# Patient Record
Sex: FEMALE | Race: WHITE | NOT HISPANIC OR LATINO | Employment: FULL TIME | URBAN - METROPOLITAN AREA
[De-identification: names, ages, dates, MRNs, and addresses within clinical notes are randomized per-mention and may not be internally consistent; named-entity substitution may affect disease eponyms.]

---

## 2020-03-13 ENCOUNTER — HOSPITAL ENCOUNTER (EMERGENCY)
Facility: HOSPITAL | Age: 30
Discharge: HOME | End: 2020-03-13
Attending: EMERGENCY MEDICINE
Payer: COMMERCIAL

## 2020-03-13 VITALS
RESPIRATION RATE: 18 BRPM | WEIGHT: 123.56 LBS | HEART RATE: 99 BPM | DIASTOLIC BLOOD PRESSURE: 74 MMHG | OXYGEN SATURATION: 100 % | HEIGHT: 62 IN | SYSTOLIC BLOOD PRESSURE: 131 MMHG | TEMPERATURE: 97 F | BODY MASS INDEX: 22.74 KG/M2

## 2020-03-13 DIAGNOSIS — R10.11 RIGHT UPPER QUADRANT ABDOMINAL PAIN: Primary | ICD-10-CM

## 2020-03-13 LAB
ALBUMIN SERPL BCP-MCNC: 3.9 G/DL (ref 3.5–5.2)
ALP SERPL-CCNC: 57 U/L (ref 55–135)
ALT SERPL W/O P-5'-P-CCNC: 11 U/L (ref 10–44)
ANION GAP SERPL CALC-SCNC: 11 MMOL/L (ref 8–16)
AST SERPL-CCNC: 16 U/L (ref 10–40)
B-HCG UR QL: NEGATIVE
BASOPHILS # BLD AUTO: 0.02 K/UL (ref 0–0.2)
BASOPHILS NFR BLD: 0.2 % (ref 0–1.9)
BILIRUB SERPL-MCNC: 0.5 MG/DL (ref 0.1–1)
BILIRUB UR QL STRIP: NEGATIVE
BUN SERPL-MCNC: 9 MG/DL (ref 6–20)
CALCIUM SERPL-MCNC: 9.4 MG/DL (ref 8.7–10.5)
CHLORIDE SERPL-SCNC: 106 MMOL/L (ref 95–110)
CLARITY UR: CLEAR
CO2 SERPL-SCNC: 23 MMOL/L (ref 23–29)
COLOR UR: YELLOW
CREAT SERPL-MCNC: 0.8 MG/DL (ref 0.5–1.4)
DIFFERENTIAL METHOD: ABNORMAL
EOSINOPHIL # BLD AUTO: 0.1 K/UL (ref 0–0.5)
EOSINOPHIL NFR BLD: 0.6 % (ref 0–8)
ERYTHROCYTE [DISTWIDTH] IN BLOOD BY AUTOMATED COUNT: 13.2 % (ref 11.5–14.5)
EST. GFR  (AFRICAN AMERICAN): >60 ML/MIN/1.73 M^2
EST. GFR  (NON AFRICAN AMERICAN): >60 ML/MIN/1.73 M^2
GLUCOSE SERPL-MCNC: 82 MG/DL (ref 70–110)
GLUCOSE UR QL STRIP: NEGATIVE
HCT VFR BLD AUTO: 37.1 % (ref 37–48.5)
HGB BLD-MCNC: 12.1 G/DL (ref 12–16)
HGB UR QL STRIP: NEGATIVE
HIV 1+2 AB+HIV1 P24 AG SERPL QL IA: NEGATIVE
IMM GRANULOCYTES # BLD AUTO: 0.03 K/UL (ref 0–0.04)
IMM GRANULOCYTES NFR BLD AUTO: 0.3 % (ref 0–0.5)
KETONES UR QL STRIP: ABNORMAL
LEUKOCYTE ESTERASE UR QL STRIP: NEGATIVE
LIPASE SERPL-CCNC: 12 U/L (ref 4–60)
LYMPHOCYTES # BLD AUTO: 2.4 K/UL (ref 1–4.8)
LYMPHOCYTES NFR BLD: 21.4 % (ref 18–48)
MCH RBC QN AUTO: 29.5 PG (ref 27–31)
MCHC RBC AUTO-ENTMCNC: 32.6 G/DL (ref 32–36)
MCV RBC AUTO: 91 FL (ref 82–98)
MONOCYTES # BLD AUTO: 0.5 K/UL (ref 0.3–1)
MONOCYTES NFR BLD: 4.7 % (ref 4–15)
NEUTROPHILS # BLD AUTO: 8.1 K/UL (ref 1.8–7.7)
NEUTROPHILS NFR BLD: 72.8 % (ref 38–73)
NITRITE UR QL STRIP: NEGATIVE
NRBC BLD-RTO: 0 /100 WBC
PH UR STRIP: 6 [PH] (ref 5–8)
PLATELET # BLD AUTO: 346 K/UL (ref 150–350)
PMV BLD AUTO: 10.3 FL (ref 9.2–12.9)
POTASSIUM SERPL-SCNC: 3.7 MMOL/L (ref 3.5–5.1)
PROT SERPL-MCNC: 7.7 G/DL (ref 6–8.4)
PROT UR QL STRIP: NEGATIVE
RBC # BLD AUTO: 4.1 M/UL (ref 4–5.4)
SODIUM SERPL-SCNC: 140 MMOL/L (ref 136–145)
SP GR UR STRIP: 1.02 (ref 1–1.03)
URN SPEC COLLECT METH UR: ABNORMAL
UROBILINOGEN UR STRIP-ACNC: NEGATIVE EU/DL
WBC # BLD AUTO: 11.1 K/UL (ref 3.9–12.7)

## 2020-03-13 PROCEDURE — 63600175 PHARM REV CODE 636 W HCPCS: Performed by: EMERGENCY MEDICINE

## 2020-03-13 PROCEDURE — 96375 TX/PRO/DX INJ NEW DRUG ADDON: CPT

## 2020-03-13 PROCEDURE — 36415 COLL VENOUS BLD VENIPUNCTURE: CPT

## 2020-03-13 PROCEDURE — 25000003 PHARM REV CODE 250: Performed by: EMERGENCY MEDICINE

## 2020-03-13 PROCEDURE — 86703 HIV-1/HIV-2 1 RESULT ANTBDY: CPT

## 2020-03-13 PROCEDURE — 99284 EMERGENCY DEPT VISIT MOD MDM: CPT | Mod: 25

## 2020-03-13 PROCEDURE — 80053 COMPREHEN METABOLIC PANEL: CPT

## 2020-03-13 PROCEDURE — 81025 URINE PREGNANCY TEST: CPT

## 2020-03-13 PROCEDURE — 85025 COMPLETE CBC W/AUTO DIFF WBC: CPT

## 2020-03-13 PROCEDURE — 83690 ASSAY OF LIPASE: CPT

## 2020-03-13 PROCEDURE — 81003 URINALYSIS AUTO W/O SCOPE: CPT

## 2020-03-13 PROCEDURE — 25500020 PHARM REV CODE 255: Performed by: FAMILY MEDICINE

## 2020-03-13 PROCEDURE — 96374 THER/PROPH/DIAG INJ IV PUSH: CPT | Mod: 59

## 2020-03-13 PROCEDURE — 25000003 PHARM REV CODE 250: Performed by: NURSE PRACTITIONER

## 2020-03-13 PROCEDURE — S0028 INJECTION, FAMOTIDINE, 20 MG: HCPCS | Performed by: EMERGENCY MEDICINE

## 2020-03-13 RX ORDER — FAMOTIDINE 10 MG/ML
20 INJECTION INTRAVENOUS
Status: COMPLETED | OUTPATIENT
Start: 2020-03-13 | End: 2020-03-13

## 2020-03-13 RX ORDER — ONDANSETRON 2 MG/ML
4 INJECTION INTRAMUSCULAR; INTRAVENOUS
Status: COMPLETED | OUTPATIENT
Start: 2020-03-13 | End: 2020-03-13

## 2020-03-13 RX ORDER — MORPHINE SULFATE 4 MG/ML
2 INJECTION, SOLUTION INTRAMUSCULAR; INTRAVENOUS
Status: COMPLETED | OUTPATIENT
Start: 2020-03-13 | End: 2020-03-13

## 2020-03-13 RX ADMIN — ONDANSETRON 4 MG: 2 INJECTION INTRAMUSCULAR; INTRAVENOUS at 05:03

## 2020-03-13 RX ADMIN — MORPHINE SULFATE 2 MG: 4 INJECTION, SOLUTION INTRAMUSCULAR; INTRAVENOUS at 05:03

## 2020-03-13 RX ADMIN — DICYCLOMINE HYDROCHLORIDE 50 ML: 10 SOLUTION ORAL at 03:03

## 2020-03-13 RX ADMIN — FAMOTIDINE 20 MG: 10 INJECTION INTRAVENOUS at 05:03

## 2020-03-13 RX ADMIN — IOHEXOL 75 ML: 350 INJECTION, SOLUTION INTRAVENOUS at 04:03

## 2020-03-13 NOTE — ED PROVIDER NOTES
29 year old female with complaint of right upper abdominal pain X 1-2 months with worsening X 1 week.  Pt had negative gallbladder u/s and HIDA scan.  No relief with Prilosec.        SCRIBE #1 NOTE: I, Conchita Kaiser, am scribing for, and in the presence of, Luisa Mauricio MD. I have scribed the HPI, ROS, and PEx.     SCRIBE #2 NOTE: I, Lanie Solorzano, am scribing for, and in the presence of,  Paula Rich MD. I have scribed the remaining portions of the note not scribed by Scribe #1.      History     Chief Complaint   Patient presents with    Abdominal Pain     pt reports RUQ pain started in Jan, currently seeing PCP for problem pain has worsen over the last week     Review of patient's allergies indicates:   Allergen Reactions    Bactrim [sulfamethoxazole-trimethoprim]     Cefzil [cefprozil]     Sulfa (sulfonamide antibiotics)          History of Present Illness     HPI    3/13/2020, 3:25 PM  History obtained from the patient      History of Present Illness: Nerissa Buckley is a 29 y.o. female patient who presents to the Emergency Department for evaluation of RUQ and epigastric abdominal pain which onset gradually 2 months ago. Symptoms are constant and moderate in severity. Pain is exacerbated after eating. No mitigating factors reported. Associated sxs include n/v. Patient denies any fever, chills, constipation, hematochezia, dysuria, hematuria, urinary frequency, diarrhea, and all other sxs at this time. Patient was evaluated by her PCP last week. US gallbladder and HIDA scan were both negative. States she pressed on patient's abdomen and pain has worsened since then. PCP advised patient to make and appointment with GI but patient did not because she wanted to come to ED instead. She started bland diet yesterday. Patient was given Prilosec and Bentyl but has not started medications. No further complaints or concerns at this time.       Arrival mode: Personal vehicle    PCP: Kelly Low MD         Past Medical History:  Past medical history reviewed not relevant      Past Surgical History:  Past surgical history reviewed not relevant      Family History:  Family history reviewed not relevant      Social History:  Social History    Social History Main Topics    Social History Main Topics    Smoking status: Unknown if ever smoked    Smokeless tobacco: Unknown if ever used    Alcohol Use: Unknown drinking history    Drug Use: Unknown if ever used    Sexual Activity: Unknown        Review of Systems     Review of Systems   Constitutional: Negative for activity change, appetite change, chills, diaphoresis, fatigue and fever.   HENT: Negative for congestion, ear pain, nosebleeds, rhinorrhea, sinus pain, sore throat and trouble swallowing.    Eyes: Negative for pain and discharge.   Respiratory: Negative for cough, chest tightness, shortness of breath, wheezing and stridor.    Cardiovascular: Negative for chest pain, palpitations and leg swelling.   Gastrointestinal: Positive for abdominal pain, nausea and vomiting. Negative for abdominal distention, blood in stool, constipation and diarrhea.   Genitourinary: Negative for difficulty urinating, dysuria, flank pain, frequency, hematuria and urgency.   Musculoskeletal: Negative for arthralgias, back pain, myalgias and neck pain.   Skin: Negative for pallor, rash and wound.   Neurological: Negative for dizziness, syncope, weakness, light-headedness, numbness and headaches.   Hematological: Does not bruise/bleed easily.   Psychiatric/Behavioral: Negative for confusion and self-injury.   All other systems reviewed and are negative.     Physical Exam     Initial Vitals [03/13/20 1352]   BP Pulse Resp Temp SpO2   (!) 143/91 91 18 99.1 °F (37.3 °C) 100 %      MAP       --          Physical Exam  Nursing Notes and Vital Signs Reviewed.  Constitutional: Patient is in no acute distress. Well-developed and well-nourished. Non toxic. Non ill-appearing.   Head: Atraumatic.  "Normocephalic.  Eyes: PERRL. EOM intact. Conjunctivae are not pale. No scleral icterus.  ENT: Mucous membranes are moist. Oropharynx is clear and symmetric.    Neck: Supple. Full ROM. No lymphadenopathy.  Cardiovascular: Regular rate. Regular rhythm. No murmurs, rubs, or gallops. Distal pulses are 2+ and symmetric.  Pulmonary/Chest: No respiratory distress. Clear to auscultation bilaterally. No wheezing or rales.  Abdominal: Soft and non-distended.  There is epigastric and RUQ tenderness.  No rebound, guarding, or rigidity. Good bowel sounds.  Genitourinary: No CVA tenderness  Musculoskeletal: Moves all extremities. No obvious deformities. No edema. No calf tenderness.  Skin: Warm and dry.  Neurological:  Alert, awake, and appropriate.  Normal speech.  No acute focal neurological deficits are appreciated.  Psychiatric: Normal affect. Good eye contact. Appropriate in content.     ED Course   Procedures  ED Vital Signs:  Vitals:    03/13/20 1352 03/13/20 1720 03/13/20 1900   BP: (!) 143/91 135/79 131/74   Pulse: 91  99   Resp: 18 20 18   Temp: 99.1 °F (37.3 °C) 98 °F (36.7 °C) 97.1 °F (36.2 °C)   TempSrc: Oral Oral Oral   SpO2: 100% 99% 100%   Weight: 56.1 kg (123 lb 9.1 oz)     Height: 5' 2" (1.575 m)         Abnormal Lab Results:  Labs Reviewed   CBC W/ AUTO DIFFERENTIAL - Abnormal; Notable for the following components:       Result Value    Gran # (ANC) 8.1 (*)     All other components within normal limits   URINALYSIS - Abnormal; Notable for the following components:    Ketones, UA 2+ (*)     All other components within normal limits   COMPREHENSIVE METABOLIC PANEL   LIPASE   PREGNANCY TEST, URINE RAPID   HIV 1 / 2 ANTIBODY        All Lab Results:  Results for orders placed or performed during the hospital encounter of 03/13/20   CBC auto differential   Result Value Ref Range    WBC 11.10 3.90 - 12.70 K/uL    RBC 4.10 4.00 - 5.40 M/uL    Hemoglobin 12.1 12.0 - 16.0 g/dL    Hematocrit 37.1 37.0 - 48.5 %    Mean " Corpuscular Volume 91 82 - 98 fL    Mean Corpuscular Hemoglobin 29.5 27.0 - 31.0 pg    Mean Corpuscular Hemoglobin Conc 32.6 32.0 - 36.0 g/dL    RDW 13.2 11.5 - 14.5 %    Platelets 346 150 - 350 K/uL    MPV 10.3 9.2 - 12.9 fL    Immature Granulocytes 0.3 0.0 - 0.5 %    Gran # (ANC) 8.1 (H) 1.8 - 7.7 K/uL    Immature Grans (Abs) 0.03 0.00 - 0.04 K/uL    Lymph # 2.4 1.0 - 4.8 K/uL    Mono # 0.5 0.3 - 1.0 K/uL    Eos # 0.1 0.0 - 0.5 K/uL    Baso # 0.02 0.00 - 0.20 K/uL    nRBC 0 0 /100 WBC    Gran% 72.8 38.0 - 73.0 %    Lymph% 21.4 18.0 - 48.0 %    Mono% 4.7 4.0 - 15.0 %    Eosinophil% 0.6 0.0 - 8.0 %    Basophil% 0.2 0.0 - 1.9 %    Differential Method Automated    Comprehensive metabolic panel   Result Value Ref Range    Sodium 140 136 - 145 mmol/L    Potassium 3.7 3.5 - 5.1 mmol/L    Chloride 106 95 - 110 mmol/L    CO2 23 23 - 29 mmol/L    Glucose 82 70 - 110 mg/dL    BUN, Bld 9 6 - 20 mg/dL    Creatinine 0.8 0.5 - 1.4 mg/dL    Calcium 9.4 8.7 - 10.5 mg/dL    Total Protein 7.7 6.0 - 8.4 g/dL    Albumin 3.9 3.5 - 5.2 g/dL    Total Bilirubin 0.5 0.1 - 1.0 mg/dL    Alkaline Phosphatase 57 55 - 135 U/L    AST 16 10 - 40 U/L    ALT 11 10 - 44 U/L    Anion Gap 11 8 - 16 mmol/L    eGFR if African American >60 >60 mL/min/1.73 m^2    eGFR if non African American >60 >60 mL/min/1.73 m^2   Lipase   Result Value Ref Range    Lipase 12 4 - 60 U/L   Urinalysis   Result Value Ref Range    Specimen UA Urine, Clean Catch     Color, UA Yellow Yellow, Straw, Jazmyne    Appearance, UA Clear Clear    pH, UA 6.0 5.0 - 8.0    Specific Gravity, UA 1.025 1.005 - 1.030    Protein, UA Negative Negative    Glucose, UA Negative Negative    Ketones, UA 2+ (A) Negative    Bilirubin (UA) Negative Negative    Occult Blood UA Negative Negative    Nitrite, UA Negative Negative    Urobilinogen, UA Negative <2.0 EU/dL    Leukocytes, UA Negative Negative   Rapid Pregnancy, Urine   Result Value Ref Range    Preg Test, Ur Negative    HIV 1/2 Ag/Ab (4th Gen)    Result Value Ref Range    HIV 1/2 Ag/Ab Negative Negative         Imaging Results:  Imaging Results          CT Abdomen Pelvis With Contrast (Final result)  Result time 03/13/20 17:14:44    Final result by Michael Joseph MD (03/13/20 17:14:44)                 Impression:      Nondilated fluid bowel loops with mild hyperemia is seen within the lower abdomen could reflect mild enteritis.    All CT scans at this facility use dose modulation, iterative reconstruction, and/or weight based dosing when appropriate to reduce radiation dose to as low as reasonable achievable.      Electronically signed by: Michael Joseph MD  Date:    03/13/2020  Time:    17:14             Narrative:    EXAMINATION:  CT ABDOMEN PELVIS WITH CONTRAST    CLINICAL HISTORY:  Abdominal pain, unspecified;    TECHNIQUE:  Low dose axial images, sagittal and coronal reformations were obtained from the lung bases to the pubic symphysis following the IV administration of 75 mL of Omnipaque 350.  Oral contrast was not administered.    COMPARISON:  None    FINDINGS:  Heart: Normal size. No effusion.    Lung Bases: Clear.    Liver: Normal size and attenuation. No focal lesions.    Gallbladder: No calcified gallstones.    Bile Ducts: No dilatation.    Pancreas: No mass. No peripancreatic fat stranding.    Spleen: Normal.    Adrenals: Normal.    Kidneys/Ureters: Normal enhancement.  No mass or  hydroureteronephrosis.    Bladder: No wall thickening.    Reproductive organs: Normal.    GI Tract/Mesentery: No evidence of bowel obstruction.  Nondilated fluid bowel loops with mild hyperemia is seen within the lower abdomen could reflect mild enteritis.  No evidence of appendicitis.    Peritoneal Space: No ascites or free air.    Retroperitoneum: No significant adenopathy.    Abdominal wall: Small fat containing umbilical hernia.    Vasculature: No aneurysm.    Bones: No acute fracture. No suspicious lytic or sclerotic lesions.                                        The Emergency Provider reviewed the vital signs and test results, which are outlined above.     ED Discussion     4:00 PM: Dr. Mauricio transfers care of pt to Dr. Rich pending lab results.    5:26 PM: Reassessed pt at this time.  Pt states her condition has improved at this time. Discussed with pt all pertinent ED information and results. Discussed pt dx and plan of tx. Gave pt all f/u and return to the ED instructions. All questions and concerns were addressed at this time. Pt expresses understanding of information and instructions, and is comfortable with plan to discharge. Pt is stable for discharge.    I discussed with patient and/or family/caretaker that evaluation in the ED does not suggest any emergent or life threatening medical conditions requiring immediate intervention beyond what was provided in the ED, and I believe patient is safe for discharge.  Regardless, an unremarkable evaluation in the ED does not preclude the development or presence of a serious of life threatening condition. As such, patient was instructed to return immediately for any worsening or change in current symptoms.         Medical Decision Making:   Clinical Tests:   Lab Tests: Reviewed and Ordered  Radiological Study: Reviewed and Ordered           ED Medication(s):  Medications   GI cocktail (mylanta 30 mL, lidocaine 2 % viscous 10 mL, dicyclomine 10 mL) 50 mL (50 mLs Oral Given 3/13/20 1558)   famotidine (PF) injection 20 mg (20 mg Intravenous Given 3/13/20 1713)   morphine injection 2 mg (2 mg Intravenous Given 3/13/20 1724)   ondansetron injection 4 mg (4 mg Intravenous Given 3/13/20 1713)   iohexoL (OMNIPAQUE 350) injection 75 mL (75 mLs Intravenous Given 3/13/20 1650)       There are no discharge medications for this patient.              Scribe Attestation:   Scribe #1: I performed the above scribed service and the documentation accurately describes the services I performed. I attest to the accuracy of the note.      Attending:   Physician Attestation Statement for Scribe #1: I, Luisa Mauricio MD, personally performed the services described in this documentation, as scribed by Conchita Kaiser, in my presence, and it is both accurate and complete.       Scribe Attestation:   Scribe #2: I performed the above scribed service and the documentation accurately describes the services I performed. I attest to the accuracy of the note.    Attending Attestation:           Physician Attestation for Scribe:    Physician Attestation Statement for Scribe #2: I, Paula Rich MD, reviewed documentation, as scribed by Lanie Solorzano in my presence, and it is both accurate and complete. I also acknowledge and confirm the content of the note done by Scribe #1.           Clinical Impression       ICD-10-CM ICD-9-CM   1. Right upper quadrant abdominal pain R10.11 789.01       Disposition:   Disposition: Discharged  Condition: Stable         Paula Rich MD  03/14/20 [id number]

## 2020-03-14 NOTE — ED NOTES
Patient identifiers verified and correct for Nerissa Buckley.    LOC: The patient is awake, alert and aware of environment with an appropriate affect, the patient is oriented x 3 and speaking appropriately.  APPEARANCE: Patient resting comfortably and in no acute distress, patient is clean and well groomed, patient's clothing is properly fastened.  SKIN: The skin is warm and dry, color consistent with ethnicity, patient has normal skin turgor and moist mucus membranes, skin intact, no breakdown or bruising noted.  MUSCULOSKELETAL: Patient moving all extremities spontaneously.  RESPIRATORY: Airway is open and patent, respirations are spontaneous.  CARDIAC: Patient has a normal rate, no periphreal edema noted, capillary refill < 3 seconds.  ABDOMEN: Soft and non tender to palpation.

## 2024-05-03 ENCOUNTER — OFFICE VISIT (OUTPATIENT)
Dept: CARDIOLOGY | Facility: CLINIC | Age: 34
End: 2024-05-03
Payer: COMMERCIAL

## 2024-05-03 ENCOUNTER — HOSPITAL ENCOUNTER (OUTPATIENT)
Dept: CARDIOLOGY | Facility: HOSPITAL | Age: 34
Discharge: HOME | End: 2024-05-03
Attending: STUDENT IN AN ORGANIZED HEALTH CARE EDUCATION/TRAINING PROGRAM
Payer: COMMERCIAL

## 2024-05-03 VITALS
BODY MASS INDEX: 24.63 KG/M2 | HEART RATE: 84 BPM | HEIGHT: 62 IN | OXYGEN SATURATION: 98 % | SYSTOLIC BLOOD PRESSURE: 120 MMHG | WEIGHT: 133.81 LBS | DIASTOLIC BLOOD PRESSURE: 70 MMHG

## 2024-05-03 DIAGNOSIS — Z76.89 ESTABLISHING CARE WITH NEW DOCTOR, ENCOUNTER FOR: ICD-10-CM

## 2024-05-03 DIAGNOSIS — R07.9 CHEST PAIN, UNSPECIFIED TYPE: ICD-10-CM

## 2024-05-03 DIAGNOSIS — Z76.89 ESTABLISHING CARE WITH NEW DOCTOR, ENCOUNTER FOR: Primary | ICD-10-CM

## 2024-05-03 DIAGNOSIS — M94.0 COSTOCHONDRITIS: Primary | ICD-10-CM

## 2024-05-03 PROCEDURE — 3008F BODY MASS INDEX DOCD: CPT | Mod: CPTII,S$GLB,, | Performed by: STUDENT IN AN ORGANIZED HEALTH CARE EDUCATION/TRAINING PROGRAM

## 2024-05-03 PROCEDURE — 93010 ELECTROCARDIOGRAM REPORT: CPT | Mod: ,,, | Performed by: INTERNAL MEDICINE

## 2024-05-03 PROCEDURE — 3074F SYST BP LT 130 MM HG: CPT | Mod: CPTII,S$GLB,, | Performed by: STUDENT IN AN ORGANIZED HEALTH CARE EDUCATION/TRAINING PROGRAM

## 2024-05-03 PROCEDURE — 99999 PR PBB SHADOW E&M-EST. PATIENT-LVL III: CPT | Mod: PBBFAC,,, | Performed by: STUDENT IN AN ORGANIZED HEALTH CARE EDUCATION/TRAINING PROGRAM

## 2024-05-03 PROCEDURE — 93005 ELECTROCARDIOGRAM TRACING: CPT | Mod: PO

## 2024-05-03 PROCEDURE — 3078F DIAST BP <80 MM HG: CPT | Mod: CPTII,S$GLB,, | Performed by: STUDENT IN AN ORGANIZED HEALTH CARE EDUCATION/TRAINING PROGRAM

## 2024-05-03 PROCEDURE — 99203 OFFICE O/P NEW LOW 30 MIN: CPT | Mod: S$GLB,,, | Performed by: STUDENT IN AN ORGANIZED HEALTH CARE EDUCATION/TRAINING PROGRAM

## 2024-05-03 PROCEDURE — 1159F MED LIST DOCD IN RCRD: CPT | Mod: CPTII,S$GLB,, | Performed by: STUDENT IN AN ORGANIZED HEALTH CARE EDUCATION/TRAINING PROGRAM

## 2024-05-03 RX ORDER — PROPRANOLOL HYDROCHLORIDE 10 MG/1
10 TABLET ORAL EVERY 8 HOURS PRN
COMMUNITY
Start: 2024-04-30 | End: 2024-10-27

## 2024-05-03 RX ORDER — METHOCARBAMOL 750 MG/1
750 TABLET, FILM COATED ORAL 3 TIMES DAILY
COMMUNITY
Start: 2024-05-01

## 2024-05-03 RX ORDER — KETOROLAC TROMETHAMINE 10 MG/1
10 TABLET, FILM COATED ORAL EVERY 6 HOURS PRN
COMMUNITY
Start: 2024-04-30 | End: 2024-05-05

## 2024-05-03 RX ORDER — ACETAMINOPHEN 500 MG
500 TABLET ORAL EVERY 6 HOURS PRN
COMMUNITY

## 2024-05-03 NOTE — PROGRESS NOTES
Section of Cardiology                  Cardiac Clinic Note    Chief Complaint/Reason for consultation: chest pain      HPI:   Nerissa Buckley is a 33 y.o. female with no significant medical history who comes in to cardiology clinic for evaluation.       5/3/24  Reports 1 week ago at work, had severe chest pain, with left arm radiation  Was told she had costochondritis in the past   Went to ED- given propranolol, ketorolac, robaxin, tyelenol  Was told BP was high  Not tolerating propanolol, and pain meds dont helped with chest pain  Still having chest pain in clinic today- normal EKG  Chest pain is intermittent  Works as   No stressors   Walks on weekends, not exercising regularly  No heavy lifting   No supplements at home, no sodas much  BP stable today    Family hx: pat gf- MI at 49 yo     Denies SOB    Denies tobacco or ETLOH abuse       EKG 5/3/24 NSR, no acute ST - T wave changes    ECHO  No results found for this or any previous visit.       STRESS TEST No results found for this or any previous visit.       C No results found for this or any previous visit.            ROS: All 10 systems reviewed. Please refer to the HPI for pertinent positives. All other systems negative.     Past Medical History  Past Medical History:   Diagnosis Date    Cervical dysplasia 4/26/2019 8:09:44 AM    [facility]: Cervical Dysplasia-No Additional Notes    Cervical dysplasia 4/26/2019 8:09:44 AM    [facility]: Cervical Dysplasia-No Additional Notes       Surgical History  Past Surgical History:   Procedure Laterality Date    ABDOMINAL SURGERY  04/2021    Gall Bladder Surgery    CERVICAL BIOPSY  W/ LOOP ELECTRODE EXCISION  09/2020    CHOLECYSTECTOMY            Allergies:   Review of patient's allergies indicates:   Allergen Reactions    Cefzil [cefprozil]     Cephalosporins Rash     Other reaction(s): Unknown    Sulfa (sulfonamide antibiotics) Rash     Sulfamethoxazole-trimethoprim Rash     Other reaction(s): Unknown       Social History:  Social History     Socioeconomic History    Marital status: Single   Tobacco Use    Smoking status: Never    Smokeless tobacco: Never   Substance and Sexual Activity    Alcohol use: Yes     Alcohol/week: 1.0 standard drink of alcohol     Types: 1 Cans of beer per week    Drug use: Never    Sexual activity: Yes     Partners: Male     Birth control/protection: OCP     Comment: Nortrel     Social Determinants of Health     Financial Resource Strain: Low Risk  (5/3/2024)    Overall Financial Resource Strain (CARDIA)     Difficulty of Paying Living Expenses: Not hard at all   Food Insecurity: No Food Insecurity (5/3/2024)    Hunger Vital Sign     Worried About Running Out of Food in the Last Year: Never true     Ran Out of Food in the Last Year: Never true   Transportation Needs: No Transportation Needs (5/3/2024)    PRAPARE - Transportation     Lack of Transportation (Medical): No     Lack of Transportation (Non-Medical): No   Physical Activity: Insufficiently Active (5/3/2024)    Exercise Vital Sign     Days of Exercise per Week: 2 days     Minutes of Exercise per Session: 30 min   Stress: Stress Concern Present (5/3/2024)    [employer] - Occupational Stress Questionnaire     Feeling of Stress : To some extent   Housing Stability: Unknown (5/3/2024)    Housing Stability Vital Sign     Unable to Pay for Housing in the Last Year: No       Family History:  family history includes Cancer in her father.    Home Medications:  Current Outpatient Medications on File Prior to Visit   Medication Sig Dispense Refill    acetaminophen (TYLENOL) 500 MG tablet Take 500 mg by mouth every 6 (six) hours as needed for Pain.      ketorolac (TORADOL) 10 mg tablet Take 10 mg by mouth every 6 (six) hours as needed.      methocarbamoL (ROBAXIN) 750 MG Tab Take 750 mg by mouth 3 (three) times daily.      norethindrone-ethinyl  "estradiol (NORTREL 0.5/35, 28,) 0.5-35 mg-mcg per tablet Take 1 tablet by mouth once daily. 84 tablet 4    propranoloL (INDERAL) 10 MG tablet Take 10 mg by mouth every 8 (eight) hours as needed.       No current facility-administered medications on file prior to visit.       Physical exam:  /70 (BP Location: Left arm, Patient Position: Sitting, BP Method: Medium (Manual))   Pulse 84   Ht 5' 2" (1.575 m)   Wt 60.7 kg (133 lb 13.1 oz)   SpO2 98%   BMI 24.48 kg/m²         General: Pt is a 33 y.o. year old female who is AAOx3, in NAD, is pleasant, well nourished, looks stated age  HEENT: PERRL, EOMI, Oral mucosa pink & moist  CVS  No abnormal cardiac pulsations noted on inspection. JVP not raised. The apical impulse is normal on palpation, and is located in the left 5th intercostal space in the mid - clavicular line. No palpable thrills or abnormal pulsations noted. RR, S1 - S2 heard, no murmurs, rubs or gallops appreciated.   PUL : CTA B/L. No wheezes/crackles heard   ABD : BS +, soft. No tenderness elicited   LE : No C/C/E. Distal Pulses palpable B/L         LABS:    Chemistry:   Lab Results   Component Value Date     03/13/2020    K 3.7 03/13/2020     03/13/2020    CO2 23 03/13/2020    BUN 9 03/13/2020    CREATININE 0.8 03/13/2020    CALCIUM 9.4 03/13/2020     Cardiac Markers: No results found for: "CKTOTAL", "CKMB", "CKMBINDEX", "TROPONINI"  Cardiac Markers (Last 3): No results found for: "CKTOTAL", "CKMB", "CKMBINDEX", "TROPONINI"  CBC:   Lab Results   Component Value Date    WBC 9.8 09/18/2023    WBC 11.10 03/13/2020    HGB 12.0 09/18/2023    HGB 12.1 03/13/2020    HCT 37.3 09/18/2023    HCT 37.1 03/13/2020    MCV 91 03/13/2020     09/18/2023     03/13/2020     Lipids:   Lab Results   Component Value Date    CHOL 152 09/18/2023    TRIG 68 09/18/2023    HDL 47 09/18/2023     Coagulation: No results found for: "PT", "INR", "APTT"        Assessment        1. Costochondritis     "     Plan:    Costochondritis   Severely tender on palpation  Take 20 mg of Toradol then 10 mg q4-6h x 2 days   Prn robaxin  Normal EKG today    Low salt, low fat diet  Exercise as tolerated, at least 30 min daily     This note was prepared using voice recognition system and is likely to have sound alike errors that may have been overlooked even after proofreading.     I have reviewed all pertinent chart information.  Plans and recommendations have been formulated under my direct supervision. All questions answered and patient voiced understanding.   If symptoms persist go to the ED.    RTC prn         Matt Mcdowell MD  Cardiology

## 2024-05-04 LAB
OHS QRS DURATION: 76 MS
OHS QTC CALCULATION: 429 MS

## 2024-07-01 ENCOUNTER — OFFICE VISIT (OUTPATIENT)
Dept: CARDIOLOGY | Facility: CLINIC | Age: 34
End: 2024-07-01
Payer: COMMERCIAL

## 2024-07-01 ENCOUNTER — HOSPITAL ENCOUNTER (OUTPATIENT)
Dept: CARDIOLOGY | Facility: HOSPITAL | Age: 34
Discharge: HOME | End: 2024-07-01
Attending: INTERNAL MEDICINE
Payer: COMMERCIAL

## 2024-07-01 VITALS
WEIGHT: 134.5 LBS | HEART RATE: 83 BPM | DIASTOLIC BLOOD PRESSURE: 78 MMHG | SYSTOLIC BLOOD PRESSURE: 118 MMHG | OXYGEN SATURATION: 98 % | BODY MASS INDEX: 24.6 KG/M2

## 2024-07-01 DIAGNOSIS — R07.9 CHEST PAIN, UNSPECIFIED TYPE: Primary | ICD-10-CM

## 2024-07-01 DIAGNOSIS — R06.09 OTHER FORM OF DYSPNEA: ICD-10-CM

## 2024-07-01 DIAGNOSIS — R07.9 CHEST PAIN, UNSPECIFIED TYPE: ICD-10-CM

## 2024-07-01 DIAGNOSIS — M94.0 COSTOCHONDRITIS: ICD-10-CM

## 2024-07-01 DIAGNOSIS — R00.2 PALPITATIONS: ICD-10-CM

## 2024-07-01 PROCEDURE — 3078F DIAST BP <80 MM HG: CPT | Mod: CPTII,S$GLB,, | Performed by: INTERNAL MEDICINE

## 2024-07-01 PROCEDURE — 99999 PR PBB SHADOW E&M-EST. PATIENT-LVL III: CPT | Mod: PBBFAC,,, | Performed by: INTERNAL MEDICINE

## 2024-07-01 PROCEDURE — 1160F RVW MEDS BY RX/DR IN RCRD: CPT | Mod: CPTII,S$GLB,, | Performed by: INTERNAL MEDICINE

## 2024-07-01 PROCEDURE — 1159F MED LIST DOCD IN RCRD: CPT | Mod: CPTII,S$GLB,, | Performed by: INTERNAL MEDICINE

## 2024-07-01 PROCEDURE — 3008F BODY MASS INDEX DOCD: CPT | Mod: CPTII,S$GLB,, | Performed by: INTERNAL MEDICINE

## 2024-07-01 PROCEDURE — 99214 OFFICE O/P EST MOD 30 MIN: CPT | Mod: S$GLB,,, | Performed by: INTERNAL MEDICINE

## 2024-07-01 PROCEDURE — 93005 ELECTROCARDIOGRAM TRACING: CPT | Mod: PO

## 2024-07-01 PROCEDURE — 93010 ELECTROCARDIOGRAM REPORT: CPT | Mod: ,,, | Performed by: INTERNAL MEDICINE

## 2024-07-01 PROCEDURE — 3074F SYST BP LT 130 MM HG: CPT | Mod: CPTII,S$GLB,, | Performed by: INTERNAL MEDICINE

## 2024-07-01 RX ORDER — PANTOPRAZOLE SODIUM 40 MG/1
40 TABLET, DELAYED RELEASE ORAL DAILY
Qty: 30 TABLET | Refills: 1 | Status: SHIPPED | OUTPATIENT
Start: 2024-07-01 | End: 2025-07-01

## 2024-07-01 RX ORDER — IBUPROFEN 600 MG/1
600 TABLET ORAL 3 TIMES DAILY
Qty: 21 TABLET | Refills: 0 | Status: SHIPPED | OUTPATIENT
Start: 2024-07-01 | End: 2024-07-08

## 2024-07-01 RX ORDER — COLCHICINE 0.6 MG/1
0.6 TABLET ORAL 2 TIMES DAILY
Qty: 60 TABLET | Refills: 0 | Status: SHIPPED | OUTPATIENT
Start: 2024-07-01 | End: 2025-07-01
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
## 2024-07-02 LAB
OHS QRS DURATION: 80 MS
OHS QTC CALCULATION: 424 MS

## 2024-07-08 ENCOUNTER — TELEPHONE (OUTPATIENT)
Dept: CARDIOLOGY | Facility: CLINIC | Age: 34
End: 2024-07-08
Payer: COMMERCIAL

## 2024-07-08 ENCOUNTER — LAB VISIT (OUTPATIENT)
Dept: LAB | Facility: HOSPITAL | Age: 34
End: 2024-07-08
Attending: INTERNAL MEDICINE
Payer: COMMERCIAL

## 2024-07-08 DIAGNOSIS — R07.9 CHEST PAIN, UNSPECIFIED TYPE: ICD-10-CM

## 2024-07-08 DIAGNOSIS — R06.09 OTHER FORM OF DYSPNEA: Primary | ICD-10-CM

## 2024-07-08 DIAGNOSIS — R06.09 OTHER FORM OF DYSPNEA: ICD-10-CM

## 2024-07-08 DIAGNOSIS — M94.0 COSTOCHONDRITIS: ICD-10-CM

## 2024-07-08 DIAGNOSIS — R00.2 PALPITATIONS: ICD-10-CM

## 2024-07-08 LAB
CRP SERPL-MCNC: 6.4 MG/L (ref 0–8.2)
ERYTHROCYTE [SEDIMENTATION RATE] IN BLOOD BY WESTERGREN METHOD: 9 MM/HR (ref 0–20)

## 2024-07-08 PROCEDURE — 85651 RBC SED RATE NONAUTOMATED: CPT | Performed by: INTERNAL MEDICINE

## 2024-07-08 PROCEDURE — 36415 COLL VENOUS BLD VENIPUNCTURE: CPT | Mod: PO | Performed by: INTERNAL MEDICINE

## 2024-07-08 PROCEDURE — 86140 C-REACTIVE PROTEIN: CPT | Performed by: INTERNAL MEDICINE

## 2024-07-08 PROCEDURE — 85379 FIBRIN DEGRADATION QUANT: CPT | Performed by: INTERNAL MEDICINE

## 2024-07-08 NOTE — TELEPHONE ENCOUNTER
Pt had labs today and they was question as to whether or not the D_dimer was needed     lencho enough blood to run if needed    -Mustapha- #[id number] - let her know    Please advise:

## 2024-07-09 LAB — D DIMER PPP IA.FEU-MCNC: 0.43 MG/L FEU

## 2024-07-31 ENCOUNTER — HOSPITAL ENCOUNTER (OUTPATIENT)
Dept: CARDIOLOGY | Facility: HOSPITAL | Age: 34
Discharge: HOME | End: 2024-07-31
Attending: INTERNAL MEDICINE
Payer: COMMERCIAL

## 2024-07-31 VITALS
BODY MASS INDEX: 24.66 KG/M2 | SYSTOLIC BLOOD PRESSURE: 118 MMHG | WEIGHT: 134 LBS | HEIGHT: 62 IN | DIASTOLIC BLOOD PRESSURE: 78 MMHG

## 2024-07-31 DIAGNOSIS — R00.2 PALPITATIONS: ICD-10-CM

## 2024-07-31 DIAGNOSIS — M94.0 COSTOCHONDRITIS: ICD-10-CM

## 2024-07-31 DIAGNOSIS — R06.09 OTHER FORM OF DYSPNEA: ICD-10-CM

## 2024-07-31 DIAGNOSIS — R07.9 CHEST PAIN, UNSPECIFIED TYPE: ICD-10-CM

## 2024-07-31 LAB
AV INDEX (PROSTH): 0.57
AV MEAN GRADIENT: 5 MMHG
AV PEAK GRADIENT: 10 MMHG
AV VALVE AREA BY VELOCITY RATIO: 1.72 CM²
AV VALVE AREA: 1.75 CM²
AV VELOCITY RATIO: 0.56
BSA FOR ECHO PROCEDURE: 1.63 M2
CV ECHO LV RWT: 0.37 CM
CV STRESS BASE HR: 80 BPM
DIASTOLIC BLOOD PRESSURE: 83 MMHG
DOP CALC AO PEAK VEL: 1.59 M/S
DOP CALC AO VTI: 34.8 CM
DOP CALC LVOT AREA: 3.1 CM2
DOP CALC LVOT DIAMETER: 1.98 CM
DOP CALC LVOT PEAK VEL: 0.89 M/S
DOP CALC LVOT STROKE VOLUME: 60.93 CM3
DOP CALC RVOT PEAK VEL: 0.71 M/S
DOP CALC RVOT VTI: 17.6 CM
DOP CALCLVOT PEAK VEL VTI: 19.8 CM
E WAVE DECELERATION TIME: 111.45 MSEC
E/A RATIO: 1.43
E/E' RATIO: 8.96 M/S
ECHO LV POSTERIOR WALL: 0.83 CM (ref 0.6–1.1)
EJECTION FRACTION: 60 %
FRACTIONAL SHORTENING: 31 % (ref 28–44)
INTERVENTRICULAR SEPTUM: 0.74 CM (ref 0.6–1.1)
IVRT: 91.34 MSEC
LA MAJOR: 3.9 CM
LA MINOR: 4.86 CM
LA WIDTH: 2.9 CM
LEFT ATRIUM AREA SYSTOLIC (APICAL 2 CHAMBER): 17.58 CM2
LEFT ATRIUM AREA SYSTOLIC (APICAL 4 CHAMBER): 13.17 CM2
LEFT ATRIUM SIZE: 3.4 CM
LEFT ATRIUM VOLUME INDEX MOD: 25.1 ML/M2
LEFT ATRIUM VOLUME INDEX: 22.5 ML/M2
LEFT ATRIUM VOLUME MOD: 40.44 CM3
LEFT ATRIUM VOLUME: 36.27 CM3
LEFT INTERNAL DIMENSION IN SYSTOLE: 3.14 CM (ref 2.1–4)
LEFT VENTRICLE DIASTOLIC VOLUME INDEX: 58.06 ML/M2
LEFT VENTRICLE DIASTOLIC VOLUME: 93.47 ML
LEFT VENTRICLE END SYSTOLIC VOLUME APICAL 2 CHAMBER: 48.37 ML
LEFT VENTRICLE END SYSTOLIC VOLUME APICAL 4 CHAMBER: 29.69 ML
LEFT VENTRICLE MASS INDEX: 69 G/M2
LEFT VENTRICLE SYSTOLIC VOLUME INDEX: 24.4 ML/M2
LEFT VENTRICLE SYSTOLIC VOLUME: 39.27 ML
LEFT VENTRICULAR INTERNAL DIMENSION IN DIASTOLE: 4.52 CM (ref 3.5–6)
LEFT VENTRICULAR MASS: 111.69 G
LV LATERAL E/E' RATIO: 10.3 M/S
LV SEPTAL E/E' RATIO: 7.92 M/S
LVED V (TEICH): 93.47 ML
LVES V (TEICH): 39.27 ML
LVOT MG: 1.67 MMHG
LVOT MV: 0.61 CM/S
MV PEAK A VEL: 0.72 M/S
MV PEAK E VEL: 1.03 M/S
OHS CV CPX 85 PERCENT MAX PREDICTED HEART RATE MALE: 156
OHS CV CPX ESTIMATED METS: 12
OHS CV CPX MAX PREDICTED HEART RATE: 184
OHS CV CPX PATIENT IS FEMALE: 1
OHS CV CPX PATIENT IS MALE: 0
OHS CV CPX PEAK DIASTOLIC BLOOD PRESSURE: 84 MMHG
OHS CV CPX PEAK HEAR RATE: 151 BPM
OHS CV CPX PEAK RATE PRESSURE PRODUCT: NORMAL
OHS CV CPX PEAK SYSTOLIC BLOOD PRESSURE: 148 MMHG
OHS CV CPX PERCENT MAX PREDICTED HEART RATE ACHIEVED: 87
OHS CV CPX RATE PRESSURE PRODUCT PRESENTING: NORMAL
OHS CV RV/LV RATIO: 0.48 CM
PISA TR MAX VEL: 2.97 M/S
PULM VEIN S/D RATIO: 1.31
PV MEAN GRADIENT: 1 MMHG
PV MV: 0.68 M/S
PV PEAK D VEL: 0.49 M/S
PV PEAK GRADIENT: 4 MMHG
PV PEAK S VEL: 0.64 M/S
PV PEAK VELOCITY: 1.05 M/S
RA MAJOR: 4.01 CM
RA PRESSURE ESTIMATED: 3 MMHG
RA WIDTH: 2.29 CM
RIGHT VENTRICULAR END-DIASTOLIC DIMENSION: 2.19 CM
RV TB RVSP: 6 MMHG
SINUS: 2.64 CM
STJ: 2.45 CM
STRESS ECHO POST EXERCISE DUR MIN: 9 MINUTES
STRESS ECHO POST EXERCISE DUR SEC: 38 SECONDS
SYSTOLIC BLOOD PRESSURE: 125 MMHG
TDI LATERAL: 0.1 M/S
TDI SEPTAL: 0.13 M/S
TDI: 0.12 M/S
TR MAX PG: 35 MMHG
TRICUSPID ANNULAR PLANE SYSTOLIC EXCURSION: 2.17 CM
TV REST PULMONARY ARTERY PRESSURE: 38 MMHG
Z-SCORE OF LEFT VENTRICULAR DIMENSION IN END DIASTOLE: -0.1
Z-SCORE OF LEFT VENTRICULAR DIMENSION IN END SYSTOLE: 0.81

## 2024-07-31 PROCEDURE — 93306 TTE W/DOPPLER COMPLETE: CPT | Mod: PO

## 2024-07-31 PROCEDURE — 93018 CV STRESS TEST I&R ONLY: CPT | Mod: ,,, | Performed by: INTERNAL MEDICINE

## 2024-07-31 PROCEDURE — 93306 TTE W/DOPPLER COMPLETE: CPT | Mod: 26,,, | Performed by: INTERNAL MEDICINE

## 2024-07-31 PROCEDURE — 93017 CV STRESS TEST TRACING ONLY: CPT | Mod: PO

## 2024-07-31 PROCEDURE — 93016 CV STRESS TEST SUPVJ ONLY: CPT | Mod: ,,, | Performed by: INTERNAL MEDICINE

## 2024-08-07 ENCOUNTER — TELEPHONE (OUTPATIENT)
Dept: CARDIOLOGY | Facility: CLINIC | Age: 34
End: 2024-08-07
Payer: COMMERCIAL

## 2024-12-12 ENCOUNTER — PATIENT MESSAGE (OUTPATIENT)
Dept: RESEARCH | Facility: HOSPITAL | Age: 34
End: 2024-12-12
Payer: COMMERCIAL